# Patient Record
Sex: FEMALE
[De-identification: names, ages, dates, MRNs, and addresses within clinical notes are randomized per-mention and may not be internally consistent; named-entity substitution may affect disease eponyms.]

---

## 2017-08-05 ENCOUNTER — HEALTH MAINTENANCE LETTER (OUTPATIENT)
Age: 32
End: 2017-08-05

## 2024-05-16 ENCOUNTER — APPOINTMENT (OUTPATIENT)
Dept: URBAN - METROPOLITAN AREA CLINIC 252 | Age: 39
Setting detail: DERMATOLOGY
End: 2024-05-16

## 2024-05-16 VITALS — HEIGHT: 71 IN | WEIGHT: 200 LBS

## 2024-05-16 DIAGNOSIS — B35.8 OTHER DERMATOPHYTOSES: ICD-10-CM

## 2024-05-16 DIAGNOSIS — L30.2 CUTANEOUS AUTOSENSITIZATION: ICD-10-CM

## 2024-05-16 PROCEDURE — 99203 OFFICE O/P NEW LOW 30 MIN: CPT

## 2024-05-16 PROCEDURE — OTHER KOH PREP: OTHER

## 2024-05-16 PROCEDURE — OTHER PRESCRIPTION: OTHER

## 2024-05-16 PROCEDURE — OTHER COUNSELING: OTHER

## 2024-05-16 RX ORDER — PREDNISONE 20 MG/1
TABLET ORAL
Qty: 26 | Refills: 0 | Status: ERX | COMMUNITY
Start: 2024-05-16

## 2024-05-16 RX ORDER — TERBINAFINE HYDROCHLORIDE 250 MG/1
TABLET ORAL QD
Qty: 21 | Refills: 0 | Status: ERX | COMMUNITY
Start: 2024-05-16

## 2024-05-16 ASSESSMENT — LOCATION DETAILED DESCRIPTION DERM
LOCATION DETAILED: LEFT DISTAL DORSAL FOREARM
LOCATION DETAILED: RIGHT DISTAL DORSAL FOREARM
LOCATION DETAILED: RIGHT ELBOW
LOCATION DETAILED: RIGHT ANTERIOR PROXIMAL THIGH
LOCATION DETAILED: LEFT DISTAL PRETIBIAL REGION
LOCATION DETAILED: INFERIOR THORACIC SPINE
LOCATION DETAILED: LEFT DISTAL POSTERIOR UPPER ARM
LOCATION DETAILED: LEFT ANTERIOR PROXIMAL THIGH

## 2024-05-16 ASSESSMENT — LOCATION SIMPLE DESCRIPTION DERM
LOCATION SIMPLE: RIGHT FOREARM
LOCATION SIMPLE: LEFT POSTERIOR UPPER ARM
LOCATION SIMPLE: LEFT THIGH
LOCATION SIMPLE: RIGHT ELBOW
LOCATION SIMPLE: LEFT FOREARM
LOCATION SIMPLE: UPPER BACK
LOCATION SIMPLE: LEFT PRETIBIAL REGION
LOCATION SIMPLE: RIGHT THIGH

## 2024-05-16 ASSESSMENT — LOCATION ZONE DERM
LOCATION ZONE: LEG
LOCATION ZONE: ARM
LOCATION ZONE: TRUNK

## 2024-05-16 NOTE — PROCEDURE: KOH PREP
Detail Level: Detailed
Cpt Desired: 
Showing: fungal hyphal elements: positive
Koh Intro Text (From The.....): A KOH prep was ordered and evaluated from the
Koh Procedure Text (Tissue Harvesting Technique): A 15-blade scalpel was used to scrape the skin. The skin scrapings were placed on a glass slide, covered with a coverslip and a KOH solution was applied.
1 person assist

## 2024-05-16 NOTE — HPI: RASH
How Severe Is Your Rash?: severe
Is This A New Presentation, Or A Follow-Up?: Rash
Additional History: Itch is 6-7/10.  Was given 5 day course of prednisone maybe 40mg qd that didnt help.  Triamcinolone 0.1% was tired and this has helped. Using a hypoallerginic soap since this started after a few weeks.

## 2024-12-03 ENCOUNTER — APPOINTMENT (OUTPATIENT)
Dept: URBAN - METROPOLITAN AREA CLINIC 252 | Age: 39
Setting detail: DERMATOLOGY
End: 2024-12-04

## 2024-12-03 VITALS — WEIGHT: 200 LBS | HEIGHT: 70 IN

## 2024-12-03 DIAGNOSIS — L28.0 LICHEN SIMPLEX CHRONICUS: ICD-10-CM

## 2024-12-03 DIAGNOSIS — L20.89 OTHER ATOPIC DERMATITIS: ICD-10-CM

## 2024-12-03 PROCEDURE — OTHER PRESCRIPTION SAMPLES PROVIDED: OTHER

## 2024-12-03 PROCEDURE — OTHER COUNSELING: OTHER

## 2024-12-03 PROCEDURE — 96372 THER/PROPH/DIAG INJ SC/IM: CPT

## 2024-12-03 PROCEDURE — OTHER DUPIXENT INJECTION: OTHER

## 2024-12-03 PROCEDURE — 99214 OFFICE O/P EST MOD 30 MIN: CPT | Mod: 25

## 2024-12-03 PROCEDURE — OTHER PRESCRIPTION: OTHER

## 2024-12-03 RX ORDER — CLOBETASOL PROPIONATE 0.5 MG/G
CREAM TOPICAL
Qty: 60 | Refills: 1 | Status: ERX | COMMUNITY
Start: 2024-12-03

## 2024-12-03 ASSESSMENT — LOCATION SIMPLE DESCRIPTION DERM
LOCATION SIMPLE: RIGHT THIGH
LOCATION SIMPLE: LEFT PRETIBIAL REGION
LOCATION SIMPLE: LEFT THIGH
LOCATION SIMPLE: LEFT FOREARM
LOCATION SIMPLE: RIGHT FOREARM

## 2024-12-03 ASSESSMENT — LOCATION DETAILED DESCRIPTION DERM
LOCATION DETAILED: RIGHT PROXIMAL DORSAL FOREARM
LOCATION DETAILED: LEFT ANTERIOR DISTAL THIGH
LOCATION DETAILED: LEFT PROXIMAL PRETIBIAL REGION
LOCATION DETAILED: LEFT PROXIMAL DORSAL FOREARM
LOCATION DETAILED: RIGHT ANTERIOR DISTAL THIGH

## 2024-12-03 ASSESSMENT — LOCATION ZONE DERM
LOCATION ZONE: ARM
LOCATION ZONE: LEG

## 2024-12-03 NOTE — HPI: RASH
Is This A New Presentation, Or A Follow-Up?: Rash
Additional History: Given prednisone on november 23 for 5 days at urgent care and it helped some on arms. Also given triamcinolone/nystatin cream.  Was on face and now resolved.

## 2024-12-03 NOTE — PROCEDURE: DUPIXENT INJECTION
Was The Medication Purchased By The Clinic?: No
Use Enhanced Ndc?: Yes
J-Code: 
Detail Level: Detailed
Expiration Date (Optional): 05/31/26
71435 Billing Preferences: 1
Administered By (Optional): Clive Reveles PA-C
Ndc (200 Mg Prefilled Pen): 2415-7499-24
Ndc (200 Mg Prefilled Syringe): 0070-4666-35
Ndc (300 Mg Prefilled Syringe): 3015-1994-07
Lot # (Optional): [id number]
Ndc (300 Mg Prefilled Pen): 1169-7308-94
Dupixent Dosing: 300 mg
Consent: The risks of pain and injection site reactions were reviewed with the patient prior to the injection.
Date Of Next Injection: 2 Weeks
Syringe Size Used (Required For Enhanced Ndc): 300 mg/2ml prefilled pen

## 2024-12-03 NOTE — PROCEDURE: COUNSELING
Patient Specific Counseling (Will Not Stick From Patient To Patient): Recommended diluted bleach baths in bucket qod until resolved. Discussed aggressive use of clobetasol with the taper. Patient expressed understanding. Return to clinic in four weeks from now if not completely itch free. Patient expressed understanding.
Detail Level: Detailed
Detail Level: Zone
Patient Specific Counseling (Will Not Stick From Patient To Patient): - samples of Dupixent were given to the pt today. Did injection training with her and Clive injected the first syringe and injected the second syringe. Patient sent home with a sample of duplicate to Use 14 days from today. Advise that we anticipate only needing dupixent temporarily. discussed risks and expectations in extensive detail\\n- would plan to send in Tacrolimus ointment or kenalog injections if not clear after a month.\\n- pt to start using all free and clear detergent Vanicream everything. Patient expressed understanding.
